# Patient Record
Sex: FEMALE | Race: WHITE | NOT HISPANIC OR LATINO | URBAN - METROPOLITAN AREA
[De-identification: names, ages, dates, MRNs, and addresses within clinical notes are randomized per-mention and may not be internally consistent; named-entity substitution may affect disease eponyms.]

---

## 2017-05-26 NOTE — PATIENT DISCUSSION
(H10.023) Other mucopurulent conjunctivitis, bilateral - Assesment : Examination revealed other mucopurulent conjunctivitis. - Plan : Start Besivance tid OU for 1 week. E-Rx sent to pharmacy and coupon given today. Advised Pt that the gtt is milky and can cause some temporary blurred vision so caution should be used. Advised Pt that condition is contagious and should wash hands frequently and after touching near eyes, avoid sharing towels and pillows. Cold compresses and ATs recommended for comfort. Call if symptoms worsen, do not improve, or new symptoms or vision changes occur. RTC as scheduled in June, sooner for problems.

## 2017-08-25 NOTE — PATIENT DISCUSSION
(H4.9955) Primary open-angle glaucoma bilateral mild stage - Assesment : Examination revealed Primary Open Angle Glaucoma. OCT ONH wnl and stable OS today, OD unable. - Plan : Continue to monitor for IOP and NFL changes with visits and testing. Continue Travatan  0.005% OU qhs RTC in 6 months IOP Check and OCT ONH, sooner for problems.

## 2017-08-25 NOTE — PATIENT DISCUSSION
(H25.11) Age-related nuclear cataract, right eye - Assesment : Examination revealed cataract. Pt is bothered and Dr. Frances Delvalle recommended removal. - Plan : Recommended cataract extraction Od to improve visual function OD. Pt wishes to prceed. Schedule [facility] and Consult.

## 2017-10-30 NOTE — PATIENT DISCUSSION
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 - Plan : Continue following with Dr. Yosi Hermosillo as scheduled.

## 2017-10-30 NOTE — PATIENT DISCUSSION
(H25.11) Age-related nuclear cataract, right eye - Assesment : Examination revealed cataract. OD: 3+ NSC, 3+ PSC NASALLY  ADVISED PATIENT ASTIGMATISM PRESENT OD.  TORIC IOLS ARE AVAILABLE TO CORRECT THE MAJORITY OF THE ASTIGMATISM. IF PATIENT WISHES TO DEFER TORIC IOL WILL NEED TO WEAR GLASSES FULL TIME TO MAXIMIZE THE VISION AT [place]. ADVISED PATIENT THE ASTIGMATISM IS LIKELY TO BE MORE NOTICEABLE AFTER THE CATARACT IS REMOVED OD. ASTIGMATISM DISCUSSED AT LENGTH WITH PATIENT. ADVISED PATIENT AT RISK OF MACULAR EDEMA SECONDARY TO DIABETES. - Plan : Risks, Benefits and Alternatives were discussed with patient at length for Cataract Surgery. Visual symptoms are consistent with Cataract findings on examination and current refraction no longer provides satisfactory vision. Patient understands and desires surgery. All questions answered. Risks, Benefits and Alternatives discussed at length for IOL placement. Doctor suggests. TORIC IOL . Patient desires LIMITED FOCUS. [place] Patient will need to wear glasses for BEST DISTANCE AND NEAR OF CHOOSES BASIC. IF PATIENT CHOOSES TORIC , GLASSES FOR BEST NEAR VISION. EYE: OD  IOL TYPE: LIMITED FOCUS.  POST OPERATIVE TARGET: DISTANCE PLANO TO -0.50 PACKAGE:  NO PACKAGE **EXTENDED GTT SCHEDULE**

## 2017-11-15 NOTE — PATIENT DISCUSSION
(Z96.1) Presence of intraocular lens - Assesment : Patient is Pseudophakic. 1 drop of travatan z and combigan inserted today in patients eye today - Plan : Signs and symptoms of infection and retinal detachment are outlined in your surgical packet. Do not rub operated eye. Follow drop schedule. If redness, pain, decreased vision, flashes or floaters occur then contact clinic.  1 week refraction/dilation

## 2017-11-21 NOTE — PATIENT DISCUSSION
(Z96.1) Presence of intraocular lens - Assesment : Patient is Pseudophakic. - Plan : Signs and symptoms of infection and retinal detachment are outlined in your surgical packet. Do not rub operated eye. Follow drop schedule. If redness, pain, decreased vision, flashes or floaters occur then contact clinic.  Rtc 3-4 weeks final post op with [facility]

## 2017-12-12 NOTE — PATIENT DISCUSSION
(Z96.1) Presence of intraocular lens - Assesment : Patient is Pseudophakic. Doing well. - Plan : Monitor. Updated GLRx given today. Pt to call with any vision changes, problems, or concerns. RTC in Feb for Exam and OCT ONH, sooner if problems or changes occur.

## 2018-02-27 NOTE — PATIENT DISCUSSION
(H42.7624) Primary open-angle glaucoma bilateral mild stage - Assesment : Examination revealed Primary Open Angle Glaucoma. OCT ONH wnl and stable OS today, OD unable. - Plan : Continue to monitor for IOP and NFL changes with visits and testing. Continue Travatan  0.005% OU qhs RTC in 6 months IOP Check and VF 24-2, sooner for problems.

## 2020-08-12 ENCOUNTER — IMPORTED ENCOUNTER (OUTPATIENT)
Dept: URBAN - METROPOLITAN AREA CLINIC 50 | Facility: CLINIC | Age: 55
End: 2020-08-12

## 2021-04-17 ASSESSMENT — VISUAL ACUITY
OD_PH: 20/40-2
OS_CC: J1+
OS_BAT: 20/20-2
OS_PH: 20/40-2
OD_BAT: 20/20-2
OD_OTHER: 20/20-2. 20/25-2.
OS_SC: 20/200
OD_SC: 20/70-
OS_OTHER: 20/20-2. 20/30.
OD_CC: J1+

## 2021-04-17 ASSESSMENT — TONOMETRY
OS_IOP_MMHG: 17
OD_IOP_MMHG: 17

## 2021-08-09 ENCOUNTER — PREPPED CHART (OUTPATIENT)
Dept: URBAN - METROPOLITAN AREA CLINIC 48 | Facility: CLINIC | Age: 56
End: 2021-08-09

## 2021-08-09 ASSESSMENT — VISUAL ACUITY
OS_PH: 20/40-2
OD_SC: 20/70
OD_PH: 20/40-2
OS_SC: 20/200
OS_CC: J1+
OD_CC: J1+
OS_GLARE: 20/20-2
OD_GLARE: 20/20-2

## 2021-08-09 ASSESSMENT — TONOMETRY
OD_IOP_MMHG: 17
OS_IOP_MMHG: 17

## 2021-08-25 ENCOUNTER — ROUTINE EXAM (OUTPATIENT)
Dept: URBAN - METROPOLITAN AREA CLINIC 48 | Facility: CLINIC | Age: 56
End: 2021-08-25

## 2021-08-25 DIAGNOSIS — Z01.01: ICD-10-CM

## 2021-08-25 DIAGNOSIS — H52.13: ICD-10-CM

## 2021-08-25 PROCEDURE — 92014 COMPRE OPH EXAM EST PT 1/>: CPT

## 2021-08-25 PROCEDURE — 92015 DETERMINE REFRACTIVE STATE: CPT

## 2021-08-25 ASSESSMENT — VISUAL ACUITY
OU_SC: J1
OS_CC: 20/20
OU_SC: 20/80
OD_CC: 20/20
OS_SC: 20/100
OD_SC: 20/100
OD_SC: J1
OS_CC: J1+
OU_CC: J1+
OS_SC: J1
OU_CC: 20/20
OD_CC: J1+

## 2021-08-25 ASSESSMENT — KERATOMETRY
OD_AXISANGLE2_DEGREES: 165
OD_K2POWER_DIOPTERS: 43.75
OS_AXISANGLE2_DEGREES: 180
OS_K2POWER_DIOPTERS: 44.00
OS_K1POWER_DIOPTERS: 43.00
OD_AXISANGLE_DEGREES: 075
OD_K1POWER_DIOPTERS: 43.00
OS_AXISANGLE_DEGREES: 090

## 2021-08-25 ASSESSMENT — TONOMETRY
OS_IOP_MMHG: 18
OD_IOP_MMHG: 17

## 2021-09-22 ENCOUNTER — APPOINTMENT (RX ONLY)
Dept: URBAN - METROPOLITAN AREA CLINIC 57 | Facility: CLINIC | Age: 56
Setting detail: DERMATOLOGY
End: 2021-09-22

## 2021-09-22 DIAGNOSIS — B35.3 TINEA PEDIS: ICD-10-CM | Status: INADEQUATELY CONTROLLED

## 2021-09-22 DIAGNOSIS — Z12.83 ENCOUNTER FOR SCREENING FOR MALIGNANT NEOPLASM OF SKIN: ICD-10-CM

## 2021-09-22 DIAGNOSIS — D22 MELANOCYTIC NEVI: ICD-10-CM

## 2021-09-22 DIAGNOSIS — L82.1 OTHER SEBORRHEIC KERATOSIS: ICD-10-CM

## 2021-09-22 DIAGNOSIS — D18.0 HEMANGIOMA: ICD-10-CM

## 2021-09-22 DIAGNOSIS — L81.4 OTHER MELANIN HYPERPIGMENTATION: ICD-10-CM

## 2021-09-22 PROBLEM — D22.5 MELANOCYTIC NEVI OF TRUNK: Status: ACTIVE | Noted: 2021-09-22

## 2021-09-22 PROBLEM — D18.01 HEMANGIOMA OF SKIN AND SUBCUTANEOUS TISSUE: Status: ACTIVE | Noted: 2021-09-22

## 2021-09-22 PROCEDURE — ? PRESCRIPTION

## 2021-09-22 PROCEDURE — ? COUNSELING

## 2021-09-22 PROCEDURE — ? ADDITIONAL NOTES

## 2021-09-22 PROCEDURE — 99204 OFFICE O/P NEW MOD 45 MIN: CPT

## 2021-09-22 PROCEDURE — ? SUNSCREEN RECOMMENDATIONS

## 2021-09-22 RX ORDER — CICLOPIROX OLAMINE 7.7 MG/G
CREAM TOPICAL
Qty: 90 | Refills: 0 | Status: ERX | COMMUNITY
Start: 2021-09-22

## 2021-09-22 RX ADMIN — CICLOPIROX OLAMINE 0.77%: 7.7 CREAM TOPICAL at 00:00

## 2021-09-22 ASSESSMENT — LOCATION SIMPLE DESCRIPTION DERM
LOCATION SIMPLE: LEFT UPPER BACK
LOCATION SIMPLE: ABDOMEN
LOCATION SIMPLE: LEFT ANKLE
LOCATION SIMPLE: RIGHT UPPER BACK

## 2021-09-22 ASSESSMENT — LOCATION DETAILED DESCRIPTION DERM
LOCATION DETAILED: PERIUMBILICAL SKIN
LOCATION DETAILED: LEFT ANKLE
LOCATION DETAILED: LEFT MEDIAL UPPER BACK
LOCATION DETAILED: RIGHT MEDIAL UPPER BACK
LOCATION DETAILED: EPIGASTRIC SKIN
LOCATION DETAILED: LEFT INFERIOR MEDIAL UPPER BACK

## 2021-09-22 ASSESSMENT — LOCATION ZONE DERM
LOCATION ZONE: LEG
LOCATION ZONE: TRUNK

## 2022-11-02 ENCOUNTER — ESTABLISHED PATIENT (OUTPATIENT)
Dept: URBAN - METROPOLITAN AREA CLINIC 48 | Facility: LOCATION | Age: 57
End: 2022-11-02

## 2022-11-02 DIAGNOSIS — Z01.01: ICD-10-CM

## 2022-11-02 DIAGNOSIS — H10.45: ICD-10-CM

## 2022-11-02 DIAGNOSIS — H52.13: ICD-10-CM

## 2022-11-02 DIAGNOSIS — H04.123: ICD-10-CM

## 2022-11-02 PROCEDURE — 92014 COMPRE OPH EXAM EST PT 1/>: CPT

## 2022-11-02 PROCEDURE — 92015 DETERMINE REFRACTIVE STATE: CPT

## 2022-11-02 ASSESSMENT — VISUAL ACUITY
OU_CC: J1+ @ 18 IN
OS_GLARE: 20/30
OS_CC: 20/20-1
OD_CC: 20/20-2
OD_GLARE: 20/20
OD_GLARE: 20/30
OS_GLARE: 20/20

## 2022-11-02 ASSESSMENT — KERATOMETRY
OS_AXISANGLE_DEGREES: 090
OS_K1POWER_DIOPTERS: 43.00
OD_K2POWER_DIOPTERS: 43.75
OS_K2POWER_DIOPTERS: 44.00
OD_AXISANGLE2_DEGREES: 165
OD_K1POWER_DIOPTERS: 43.00
OD_AXISANGLE_DEGREES: 075
OS_AXISANGLE2_DEGREES: 180

## 2022-11-02 ASSESSMENT — TONOMETRY
OS_IOP_MMHG: 14
OD_IOP_MMHG: 13

## 2023-09-06 ENCOUNTER — APPOINTMENT (RX ONLY)
Dept: URBAN - METROPOLITAN AREA CLINIC 56 | Facility: CLINIC | Age: 58
Setting detail: DERMATOLOGY
End: 2023-09-06

## 2023-09-06 DIAGNOSIS — L81.4 OTHER MELANIN HYPERPIGMENTATION: ICD-10-CM | Status: STABLE

## 2023-09-06 DIAGNOSIS — B35.4 TINEA CORPORIS: ICD-10-CM | Status: INADEQUATELY CONTROLLED

## 2023-09-06 DIAGNOSIS — L82.1 OTHER SEBORRHEIC KERATOSIS: ICD-10-CM | Status: STABLE

## 2023-09-06 DIAGNOSIS — Z71.89 OTHER SPECIFIED COUNSELING: ICD-10-CM

## 2023-09-06 DIAGNOSIS — L60.8 OTHER NAIL DISORDERS: ICD-10-CM

## 2023-09-06 DIAGNOSIS — L29.8 OTHER PRURITUS: ICD-10-CM | Status: INADEQUATELY CONTROLLED

## 2023-09-06 DIAGNOSIS — D22 MELANOCYTIC NEVI: ICD-10-CM | Status: STABLE

## 2023-09-06 DIAGNOSIS — D18.0 HEMANGIOMA: ICD-10-CM | Status: STABLE

## 2023-09-06 DIAGNOSIS — L60.1 ONYCHOLYSIS: ICD-10-CM | Status: INADEQUATELY CONTROLLED

## 2023-09-06 DIAGNOSIS — B35.1 TINEA UNGUIUM: ICD-10-CM

## 2023-09-06 DIAGNOSIS — L29.89 OTHER PRURITUS: ICD-10-CM | Status: INADEQUATELY CONTROLLED

## 2023-09-06 DIAGNOSIS — L85.1 ACQUIRED KERATOSIS [KERATODERMA] PALMARIS ET PLANTARIS: ICD-10-CM

## 2023-09-06 DIAGNOSIS — L73.8 OTHER SPECIFIED FOLLICULAR DISORDERS: ICD-10-CM

## 2023-09-06 PROBLEM — D18.01 HEMANGIOMA OF SKIN AND SUBCUTANEOUS TISSUE: Status: ACTIVE | Noted: 2023-09-06

## 2023-09-06 PROBLEM — D22.5 MELANOCYTIC NEVI OF TRUNK: Status: ACTIVE | Noted: 2023-09-06

## 2023-09-06 PROBLEM — L08.9 LOCAL INFECTION OF THE SKIN AND SUBCUTANEOUS TISSUE, UNSPECIFIED: Status: ACTIVE | Noted: 2023-09-06

## 2023-09-06 PROCEDURE — ? PRESCRIPTION MEDICATION MANAGEMENT

## 2023-09-06 PROCEDURE — ? COUNSELING

## 2023-09-06 PROCEDURE — ? PRESCRIPTION

## 2023-09-06 PROCEDURE — ? ADDITIONAL NOTES

## 2023-09-06 PROCEDURE — 99214 OFFICE O/P EST MOD 30 MIN: CPT

## 2023-09-06 RX ORDER — FLUOCINONIDE 0.5 MG/ML
SOLUTION TOPICAL BID
Qty: 60 | Refills: 1 | Status: ERX | COMMUNITY
Start: 2023-09-06

## 2023-09-06 RX ORDER — KETOCONAZOLE 20 MG/G
CREAM TOPICAL BID
Qty: 60 | Refills: 3 | Status: ERX | COMMUNITY
Start: 2023-09-06

## 2023-09-06 RX ORDER — PHARMACY COMPOUNDING ACCESSORY
EACH MISCELLANEOUS QD
Qty: 1 | Refills: 2 | Status: ERX | COMMUNITY
Start: 2023-09-06

## 2023-09-06 RX ORDER — GENTAMICIN SULFATE 3 MG/ML
SOLUTION/ DROPS OPHTHALMIC BID
Qty: 5 | Refills: 1 | Status: ERX | COMMUNITY
Start: 2023-09-06

## 2023-09-06 RX ADMIN — FLUOCINONIDE: 0.5 SOLUTION TOPICAL at 00:00

## 2023-09-06 RX ADMIN — Medication: at 00:00

## 2023-09-06 RX ADMIN — KETOCONAZOLE: 20 CREAM TOPICAL at 00:00

## 2023-09-06 RX ADMIN — GENTAMICIN SULFATE: 3 SOLUTION/ DROPS OPHTHALMIC at 00:00

## 2023-09-06 ASSESSMENT — LOCATION DETAILED DESCRIPTION DERM
LOCATION DETAILED: LEFT INFERIOR MEDIAL MIDBACK
LOCATION DETAILED: RIGHT MEDIAL PLANTAR HEEL
LOCATION DETAILED: POSTERIOR MID-PARIETAL SCALP
LOCATION DETAILED: LEFT LATERAL PROXIMAL CALF
LOCATION DETAILED: LEFT SUPERIOR MEDIAL MIDBACK
LOCATION DETAILED: LEFT MID-UPPER BACK
LOCATION DETAILED: RIGHT DISTAL PLANTAR GREAT TOE
LOCATION DETAILED: RIGHT INDEX FINGERNAIL
LOCATION DETAILED: NASAL DORSUM
LOCATION DETAILED: RIGHT CENTRAL FRONTAL SCALP
LOCATION DETAILED: LEFT CENTRAL FRONTAL SCALP
LOCATION DETAILED: LEFT GREAT TOENAIL
LOCATION DETAILED: LEFT INDEX FINGERNAIL
LOCATION DETAILED: LEFT INFERIOR MEDIAL UPPER BACK

## 2023-09-06 ASSESSMENT — LOCATION SIMPLE DESCRIPTION DERM
LOCATION SIMPLE: LEFT LOWER LEG
LOCATION SIMPLE: LEFT UPPER BACK
LOCATION SIMPLE: RIGHT GREAT TOE
LOCATION SIMPLE: RIGHT INDEX FINGERNAIL
LOCATION SIMPLE: NOSE
LOCATION SIMPLE: RIGHT PLANTAR SURFACE
LOCATION SIMPLE: LEFT INDEX FINGERNAIL
LOCATION SIMPLE: LEFT LOWER BACK
LOCATION SIMPLE: SCALP
LOCATION SIMPLE: POSTERIOR SCALP
LOCATION SIMPLE: LEFT GREAT TOE

## 2023-09-06 ASSESSMENT — LOCATION ZONE DERM
LOCATION ZONE: FEET
LOCATION ZONE: TOE
LOCATION ZONE: TOENAIL
LOCATION ZONE: FINGERNAIL
LOCATION ZONE: TRUNK
LOCATION ZONE: LEG
LOCATION ZONE: NOSE
LOCATION ZONE: SCALP

## 2023-09-06 NOTE — PROCEDURE: COUNSELING
Detail Level: Generalized
Detail Level: Zone
Patient Specific Counseling (Will Not Stick From Patient To Patient): Prasad nail conditioner
Detail Level: Detailed
Detail Level: Simple
Patient Specific Counseling (Will Not Stick From Patient To Patient): Amlactin or urea cream

## 2023-09-06 NOTE — PROCEDURE: ADDITIONAL NOTES
Detail Level: Zone
Render Risk Assessment In Note?: no
Additional Notes: Patient consent was obtained to proceed with the visit and recommended plan of care after discussion of all risks and benefits, including the risks of COVID-19 exposure.
Detail Level: Detailed
Additional Notes: Discussed tx options - Pt declines rx today

## 2023-09-06 NOTE — PROCEDURE: PRESCRIPTION MEDICATION MANAGEMENT
Initiate Treatment: fluocinonide 0.05 % topical solution apply sparingly to affected areas of scalp 1-2 times daily as needed for itch. Use for 2 weeks on, 2 weeks off. Avoid face and groin.\\nVanicream over the counter prn
Render In Strict Bullet Format?: No
Detail Level: Zone
Initiate Treatment: ketoconazole 2 % topical cream BID\\nQuantity: 60.0 g  Days Supply: 30\\nSig: Apply twice daily to affected areas of rash for 4 weeks or until rash appears resolved, then continue for 2 more weeks.

## 2023-10-30 ENCOUNTER — APPOINTMENT (RX ONLY)
Dept: URBAN - METROPOLITAN AREA CLINIC 154 | Facility: CLINIC | Age: 58
Setting detail: DERMATOLOGY
End: 2023-10-30

## 2023-10-30 DIAGNOSIS — B35.1 TINEA UNGUIUM: ICD-10-CM

## 2023-10-30 DIAGNOSIS — L21.8 OTHER SEBORRHEIC DERMATITIS: ICD-10-CM

## 2023-10-30 PROCEDURE — ? PRESCRIPTION

## 2023-10-30 PROCEDURE — ? COUNSELING

## 2023-10-30 PROCEDURE — 99213 OFFICE O/P EST LOW 20 MIN: CPT

## 2023-10-30 RX ORDER — FLUCONAZOLE 200 MG/1
TABLET ORAL
Qty: 12 | Refills: 1 | Status: ERX | COMMUNITY
Start: 2023-10-30

## 2023-10-30 RX ORDER — KETOCONAZOLE 20 MG/ML
SHAMPOO, SUSPENSION TOPICAL TIW
Qty: 120 | Refills: 6 | Status: ERX | COMMUNITY
Start: 2023-10-30

## 2023-10-30 RX ADMIN — FLUCONAZOLE: 200 TABLET ORAL at 00:00

## 2023-10-30 RX ADMIN — KETOCONAZOLE: 20 SHAMPOO, SUSPENSION TOPICAL at 00:00

## 2023-10-30 ASSESSMENT — LOCATION SIMPLE DESCRIPTION DERM
LOCATION SIMPLE: LEFT GREAT TOE
LOCATION SIMPLE: SCALP

## 2023-10-30 ASSESSMENT — LOCATION ZONE DERM
LOCATION ZONE: TOENAIL
LOCATION ZONE: SCALP

## 2023-10-30 ASSESSMENT — LOCATION DETAILED DESCRIPTION DERM
LOCATION DETAILED: LEFT GREAT TOENAIL
LOCATION DETAILED: RIGHT SUPERIOR PARIETAL SCALP

## 2023-11-21 ENCOUNTER — COMPREHENSIVE EXAM (OUTPATIENT)
Dept: URBAN - METROPOLITAN AREA CLINIC 48 | Facility: LOCATION | Age: 58
End: 2023-11-21

## 2023-11-21 DIAGNOSIS — Z01.01: ICD-10-CM

## 2023-11-21 DIAGNOSIS — H25.13: ICD-10-CM

## 2023-11-21 DIAGNOSIS — H04.123: ICD-10-CM

## 2023-11-21 DIAGNOSIS — H52.13: ICD-10-CM

## 2023-11-21 PROCEDURE — 92015 DETERMINE REFRACTIVE STATE: CPT

## 2023-11-21 PROCEDURE — 92014 COMPRE OPH EXAM EST PT 1/>: CPT

## 2023-11-21 ASSESSMENT — VISUAL ACUITY
OS_GLARE: 20/25-2
OS_CC: 20/20
OD_CC: 20/20
OU_CC: J1+@16
OD_GLARE: 20/20
OS_GLARE: 20/20
OD_GLARE: 20/30+2

## 2023-11-21 ASSESSMENT — KERATOMETRY
OD_AXISANGLE_DEGREES: 170
OD_K2POWER_DIOPTERS: 43.00
OS_AXISANGLE2_DEGREES: 85
OD_K1POWER_DIOPTERS: 44.00
OD_AXISANGLE2_DEGREES: 80
OS_K1POWER_DIOPTERS: 44.25
OS_AXISANGLE_DEGREES: 175
OS_K2POWER_DIOPTERS: 42.50

## 2023-11-21 ASSESSMENT — TONOMETRY
OS_IOP_MMHG: 16
OD_IOP_MMHG: 16

## 2025-04-17 ENCOUNTER — COMPREHENSIVE EXAM (OUTPATIENT)
Age: 60
End: 2025-04-17

## 2025-04-17 DIAGNOSIS — Z01.01: ICD-10-CM

## 2025-04-17 DIAGNOSIS — H25.13: ICD-10-CM

## 2025-04-17 DIAGNOSIS — H10.45: ICD-10-CM

## 2025-04-17 DIAGNOSIS — H43.812: ICD-10-CM

## 2025-04-17 DIAGNOSIS — H04.123: ICD-10-CM

## 2025-04-17 DIAGNOSIS — H52.13: ICD-10-CM

## 2025-04-17 PROCEDURE — 92015 DETERMINE REFRACTIVE STATE: CPT

## 2025-04-17 PROCEDURE — 92014 COMPRE OPH EXAM EST PT 1/>: CPT
